# Patient Record
Sex: MALE | Race: BLACK OR AFRICAN AMERICAN | Employment: FULL TIME | URBAN - METROPOLITAN AREA
[De-identification: names, ages, dates, MRNs, and addresses within clinical notes are randomized per-mention and may not be internally consistent; named-entity substitution may affect disease eponyms.]

---

## 2022-11-23 ENCOUNTER — APPOINTMENT (OUTPATIENT)
Dept: GENERAL RADIOLOGY | Age: 57
End: 2022-11-23
Payer: COMMERCIAL

## 2022-11-23 ENCOUNTER — HOSPITAL ENCOUNTER (EMERGENCY)
Age: 57
Discharge: HOME | End: 2022-11-23
Attending: EMERGENCY MEDICINE
Payer: COMMERCIAL

## 2022-11-23 VITALS
WEIGHT: 170 LBS | TEMPERATURE: 98.1 F | HEART RATE: 91 BPM | BODY MASS INDEX: 23.8 KG/M2 | SYSTOLIC BLOOD PRESSURE: 129 MMHG | DIASTOLIC BLOOD PRESSURE: 60 MMHG | HEIGHT: 71 IN | OXYGEN SATURATION: 100 % | RESPIRATION RATE: 16 BRPM

## 2022-11-23 DIAGNOSIS — M25.551 ACUTE RIGHT HIP PAIN: Primary | ICD-10-CM

## 2022-11-23 DIAGNOSIS — S39.012A STRAIN OF LUMBAR REGION, INITIAL ENCOUNTER: ICD-10-CM
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
## 2022-11-23 DIAGNOSIS — S16.1XXA ACUTE STRAIN OF NECK MUSCLE, INITIAL ENCOUNTER: ICD-10-CM

## 2022-11-23 PROCEDURE — 6360000002 HC RX W HCPCS: Performed by: NURSE PRACTITIONER

## 2022-11-23 PROCEDURE — 99284 EMERGENCY DEPT VISIT MOD MDM: CPT

## 2022-11-23 PROCEDURE — 72100 X-RAY EXAM L-S SPINE 2/3 VWS: CPT

## 2022-11-23 PROCEDURE — 72040 X-RAY EXAM NECK SPINE 2-3 VW: CPT

## 2022-11-23 PROCEDURE — 73502 X-RAY EXAM HIP UNI 2-3 VIEWS: CPT

## 2022-11-23 PROCEDURE — 96372 THER/PROPH/DIAG INJ SC/IM: CPT

## 2022-11-23 RX ORDER — IBUPROFEN 800 MG/1
800 TABLET ORAL EVERY 8 HOURS PRN
Qty: 20 TABLET | Refills: 0 | Status: SHIPPED | OUTPATIENT
Start: 2022-11-23

## 2022-11-23 RX ORDER — METHOCARBAMOL 750 MG/1
750 TABLET, FILM COATED ORAL 3 TIMES DAILY PRN
Qty: 15 TABLET | Refills: 0 | Status: SHIPPED | OUTPATIENT
Start: 2022-11-23 | End: 2022-11-28

## 2022-11-23 RX ORDER — KETOROLAC TROMETHAMINE 30 MG/ML
30 INJECTION, SOLUTION INTRAMUSCULAR; INTRAVENOUS ONCE
Status: COMPLETED | OUTPATIENT
Start: 2022-11-23 | End: 2022-11-23

## 2022-11-23 RX ORDER — HYDROCODONE BITARTRATE AND ACETAMINOPHEN 5; 325 MG/1; MG/1
1 TABLET ORAL EVERY 6 HOURS PRN
Qty: 10 TABLET | Refills: 0 | Status: SHIPPED | OUTPATIENT
Start: 2022-11-23 | End: 2022-11-26

## 2022-11-23 RX ADMIN — KETOROLAC TROMETHAMINE 30 MG: 30 INJECTION, SOLUTION INTRAMUSCULAR at 11:22

## 2022-11-23 ASSESSMENT — PAIN DESCRIPTION - DESCRIPTORS: DESCRIPTORS: SHARP

## 2022-11-23 ASSESSMENT — ENCOUNTER SYMPTOMS
COLOR CHANGE: 1
BACK PAIN: 1

## 2022-11-23 ASSESSMENT — PAIN SCALES - GENERAL
PAINLEVEL_OUTOF10: 10
PAINLEVEL_OUTOF10: 10

## 2022-11-23 ASSESSMENT — PAIN - FUNCTIONAL ASSESSMENT: PAIN_FUNCTIONAL_ASSESSMENT: 0-10

## 2022-11-23 ASSESSMENT — PAIN DESCRIPTION - LOCATION: LOCATION: BACK;HIP;NECK

## 2022-11-23 ASSESSMENT — VISUAL ACUITY: OU: 1

## 2022-11-23 ASSESSMENT — PAIN DESCRIPTION - ORIENTATION: ORIENTATION: RIGHT

## 2022-11-23 ASSESSMENT — PAIN DESCRIPTION - FREQUENCY: FREQUENCY: CONTINUOUS

## 2022-11-23 NOTE — DISCHARGE INSTRUCTIONS
Take medication as prescribed. Use crutches for comfort. You may bear weight on her right leg. Do not drive, operate heavy machinery, or consume alcohol while taking Robaxin or Norco as they can cause drowsiness. Follow-up orthopedic doctor provided for for evaluation of your hip pain. Return to emergency department as needed. You may also follow-up with primary care physician on list provided as needed.

## 2022-11-23 NOTE — Clinical Note
Savannah Parker was seen and treated in our emergency department on 11/23/2022. He may return to work on 11/28/2022. If you have any questions or concerns, please don't hesitate to call.       Valentine Crocker, APRN - CNP
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
## 2022-12-06 ENCOUNTER — HOSPITAL ENCOUNTER (EMERGENCY)
Age: 57
Discharge: HOME | End: 2022-12-06
Attending: EMERGENCY MEDICINE
Payer: COMMERCIAL

## 2022-12-06 ENCOUNTER — APPOINTMENT (OUTPATIENT)
Dept: CT IMAGING | Age: 57
End: 2022-12-06
Payer: COMMERCIAL

## 2022-12-06 VITALS
DIASTOLIC BLOOD PRESSURE: 81 MMHG | BODY MASS INDEX: 23.01 KG/M2 | OXYGEN SATURATION: 97 % | WEIGHT: 165 LBS | HEART RATE: 79 BPM | TEMPERATURE: 97.9 F | RESPIRATION RATE: 14 BRPM | SYSTOLIC BLOOD PRESSURE: 140 MMHG

## 2022-12-06 DIAGNOSIS — M25.551 RIGHT HIP PAIN: Primary | ICD-10-CM

## 2022-12-06 PROCEDURE — 96372 THER/PROPH/DIAG INJ SC/IM: CPT

## 2022-12-06 PROCEDURE — 6360000002 HC RX W HCPCS: Performed by: EMERGENCY MEDICINE

## 2022-12-06 PROCEDURE — 73700 CT LOWER EXTREMITY W/O DYE: CPT

## 2022-12-06 PROCEDURE — 99284 EMERGENCY DEPT VISIT MOD MDM: CPT

## 2022-12-06 RX ORDER — HYDROCODONE BITARTRATE AND ACETAMINOPHEN 5; 325 MG/1; MG/1
1 TABLET ORAL EVERY 6 HOURS PRN
Qty: 20 TABLET | Refills: 0 | Status: SHIPPED | OUTPATIENT
Start: 2022-12-06 | End: 2022-12-11

## 2022-12-06 RX ORDER — KETOROLAC TROMETHAMINE 30 MG/ML
60 INJECTION, SOLUTION INTRAMUSCULAR; INTRAVENOUS ONCE
Status: COMPLETED | OUTPATIENT
Start: 2022-12-06 | End: 2022-12-06

## 2022-12-06 RX ADMIN — KETOROLAC TROMETHAMINE 60 MG: 30 INJECTION, SOLUTION INTRAMUSCULAR at 16:13

## 2022-12-06 ASSESSMENT — ENCOUNTER SYMPTOMS
DIARRHEA: 0
COUGH: 0
CONSTIPATION: 0
FACIAL SWELLING: 0
EYE DISCHARGE: 0
VOMITING: 0
ABDOMINAL PAIN: 0
COLOR CHANGE: 0
EYE REDNESS: 0
SHORTNESS OF BREATH: 0

## 2022-12-06 ASSESSMENT — PAIN SCALES - GENERAL: PAINLEVEL_OUTOF10: 10

## 2022-12-06 NOTE — LETTER
[facility] ED  [address] [place] [address]  Phone: [phone]             December 9, 2022    Patient: Faraz Mcguire   YOB: 1965   Date of Visit: 12/6/2022       To Whom It May Concern:    Faraz Mcguire was seen and treated in our emergency department on 12/6/2022. He may return to work on 12/10/2022.       Sincerely,             Signature:__________________________________
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
## 2022-12-21 ENCOUNTER — OFFICE VISIT (OUTPATIENT)
Dept: ORTHOPEDIC SURGERY | Age: 57
End: 2022-12-21
Payer: COMMERCIAL

## 2022-12-21 VITALS — WEIGHT: 169 LBS | BODY MASS INDEX: 23.66 KG/M2 | HEIGHT: 71 IN

## 2022-12-21 DIAGNOSIS — S83.207A TEAR OF MENISCUS OF LEFT KNEE, UNSPECIFIED MENISCUS, UNSPECIFIED TEAR TYPE, UNSPECIFIED WHETHER OLD OR CURRENT TEAR: ICD-10-CM

## 2022-12-21 DIAGNOSIS — R52 PAIN: Primary | ICD-10-CM

## 2022-12-21 PROCEDURE — G8427 DOCREV CUR MEDS BY ELIG CLIN: HCPCS

## 2022-12-21 PROCEDURE — G8484 FLU IMMUNIZE NO ADMIN: HCPCS

## 2022-12-21 PROCEDURE — 4004F PT TOBACCO SCREEN RCVD TLK: CPT

## 2022-12-21 PROCEDURE — 99203 OFFICE O/P NEW LOW 30 MIN: CPT

## 2022-12-21 PROCEDURE — G8420 CALC BMI NORM PARAMETERS: HCPCS

## 2022-12-21 PROCEDURE — 3017F COLORECTAL CA SCREEN DOC REV: CPT

## 2022-12-21 RX ORDER — METHYLPREDNISOLONE 4 MG/1
TABLET ORAL
Qty: 1 KIT | Refills: 0 | Status: SHIPPED | OUTPATIENT
Start: 2022-12-21 | End: 2022-12-27

## 2022-12-21 NOTE — LETTER
[facility]  [address] [facility]  Phone: [phone]  Fax: [phone]    Sofía Lloyd DO        December 21, 2022     Patient: Vito Arroyo   YOB: 1965   Date of Visit: 12/21/2022       To Whom It May Concern: It is my medical opinion that Vito Arroyo may return to work on 12/22/2022. Please excuseMagueSreekanth Nick from work on 12/21/2022, as he attended his scheduled appointment in my clinic. If you have any questions or concerns, please don't hesitate to call.     Sincerely,        Sofía Lloyd DO

## 2022-12-21 NOTE — PROGRESS NOTES
[address] [facility]  [facility] [address]  Dept: [address] [place] [address] Patient      CHIEF COMPLAINT:    Chief Complaint   Patient presents with    Pain     Right hip pain       HISTORY OF PRESENT ILLNESS:    The patient is a 65 y.o. male who is being seen as a new patient for right hip pain. Patient referred to the resident clinic after a visit to the ED on 12/6/22. Patient denies acute injuries and XR's of right hip demonstrated moderate arthritic changes. Patient denies any bony low back pain, though isolates it to the muscles of low back on right side. Patient denies numbness, tingling, or weakness. Patient has not had prior injections or taken oral steroids. Patient has not tried PT to date. Patient here for evaluation and updating treatment plan. Past Medical History:    No past medical history on file. Past Surgical History:    No past surgical history on file. Current Medications:   Current Outpatient Medications   Medication Sig Dispense Refill    methylPREDNISolone (MEDROL DOSEPACK) 4 MG tablet Take by mouth. 1 kit 0    ibuprofen (ADVIL;MOTRIN) 800 MG tablet Take 1 tablet by mouth every 8 hours as needed for Pain 20 tablet 0     No current facility-administered medications for this visit.        Allergies:    Motrin [ibuprofen]    Social History:   Social History     Socioeconomic History    Marital status:      Spouse name: Not on file    Number of children: Not on file    Years of education: Not on file    Highest education level: Not on file   Occupational History    Not on file   Tobacco Use    Smoking status: Some Days     Types: Cigars     Passive exposure: Past    Smokeless tobacco: Never   Vaping Use    Vaping Use: Never used   Substance and Sexual Activity    Alcohol use: Yes     Comment: socially    Drug use: Never    Sexual activity: Not on file   Other Topics Concern    Not on file   Social History Narrative    Not on file     Social Determinants of Health     Financial Resource Strain: Not on file   Food Insecurity: Not on file   Transportation Needs: Not on file   Physical Activity: Not on file   Stress: Not on file   Social Connections: Not on file   Intimate Partner Violence: Not on file   Housing Stability: Not on file       Family History:  No family history on file. REVIEW OF SYSTEMS:  Review of Systems    Gen: no fever, chills, malaise  CV: no chest pain or palpitations  Resp: no cough or shortness of breath  GI: no nausea, vomiting, diarrhea, or constipation  Neuro: no seizures, vertigo, or headaches  Msk: Right hip pain  10 remaining systems reviewed and negative      PHYSICAL EXAM:  Ht 5' 11\" (1.803 m)   Wt 169 lb (76.7 kg)   BMI 23.57 kg/m² Body mass index is 23.57 kg/m². Physical Exam  Gen: alert and oriented, no acute distress  Psych: Appropriate affect; Appropriate knowledge base; Appropriate mood; No hallucinations  Head: normocephalic atraumatic   Chest: symmetric chest excursion  Ortho Exam  MSK:   RLE: Minimal tenderness to palpation about the hip. Skin intact. + BRYANNA, + FADIR, + Stinchfield. Hip flexion 0-130 degrees. Minimal antalgic gait favoring right side. Compartments soft. 2+ DP pulse. TA/EHL/FHL/GS motor intact. Deep and Superficial Peroneal/Saphenous/Sural/Plantar SILT. Radiology:   No new films previous films reviewed       ASSESSMENT:  65 y.o. male with chronic right hip pain    PLAN:  Patient evaluated in clinic and educated patient on XR demonstrating moderate arthritic changes of right hip. Patient educated that this may eventually need a total hip replacement. At this time, patient elected for medrol dosepak sent to pharmacy and beginning PT. Patient encouraged to continue with activities as tolerated with utilizing ice and NSAIDs as needed. Patient instructed to follow-up in 2 months to reassess response to steroid and PT progress.  Patient understood and agreed with the plan with all questions being answered to the patient's satisfaction at the time of visit. Return in about 2 months (around 2/21/2023). Orders Placed This Encounter   Medications    methylPREDNISolone (MEDROL DOSEPACK) 4 MG tablet     Sig: Take by mouth. Dispense:  1 kit     Refill:  0       Orders Placed This Encounter   Procedures    XR HIP RIGHT (2-3 VIEWS)     Standing Status:   Future     Standing Expiration Date:   12/20/2023    MRI KNEE LEFT WO CONTRAST     Standing Status:   Future     Standing Expiration Date:   12/21/2023    [facility] - [address]     Referral Priority:   Routine     Referral Type:   Eval and Treat     Referral Reason:   Specialty Services Required     Requested Specialty:   Physical Therapist     Number of Visits Requested:   Grecia Jolly     Referral Priority:   Routine     Referral Type:   Eval and Treat     Referral Reason:   Specialty Services Required     Requested Specialty:   Physical Therapist     Number of Visits Requested:   1        Electronically signed by Maksim Chaves DO PGY-2 on 12/21/2022 at 11:30 AM    This note is created with the assistance of a speech recognition program.  While intending to generate a document that actually reflects the content of the visit, the document can still have some errors including those of syntax and sound a like substitutions which may escape proof reading.   In such instances, actual meaning can be extrapolated by contextual diversion

## 2023-01-09 ENCOUNTER — HOSPITAL ENCOUNTER (EMERGENCY)
Age: 58
Discharge: HOME | End: 2023-01-09
Attending: EMERGENCY MEDICINE
Payer: COMMERCIAL

## 2023-01-09 ENCOUNTER — APPOINTMENT (OUTPATIENT)
Dept: GENERAL RADIOLOGY | Age: 58
End: 2023-01-09
Payer: COMMERCIAL

## 2023-01-09 VITALS
TEMPERATURE: 97.6 F | OXYGEN SATURATION: 100 % | RESPIRATION RATE: 18 BRPM | DIASTOLIC BLOOD PRESSURE: 73 MMHG | BODY MASS INDEX: 23.8 KG/M2 | WEIGHT: 170 LBS | HEART RATE: 101 BPM | HEIGHT: 71 IN | SYSTOLIC BLOOD PRESSURE: 150 MMHG

## 2023-01-09 DIAGNOSIS — M25.551 RIGHT HIP PAIN: Primary | ICD-10-CM

## 2023-01-09 PROCEDURE — 99283 EMERGENCY DEPT VISIT LOW MDM: CPT

## 2023-01-09 PROCEDURE — 73502 X-RAY EXAM HIP UNI 2-3 VIEWS: CPT

## 2023-01-09 PROCEDURE — 6370000000 HC RX 637 (ALT 250 FOR IP): Performed by: PHYSICIAN ASSISTANT

## 2023-01-09 RX ORDER — HYDROCODONE BITARTRATE AND ACETAMINOPHEN 5; 325 MG/1; MG/1
1 TABLET ORAL EVERY 6 HOURS PRN
Status: COMPLETED | OUTPATIENT
Start: 2023-01-09 | End: 2023-01-09

## 2023-01-09 RX ORDER — HYDROCODONE BITARTRATE AND ACETAMINOPHEN 5; 325 MG/1; MG/1
1 TABLET ORAL EVERY 8 HOURS PRN
Qty: 6 TABLET | Refills: 0 | Status: SHIPPED | OUTPATIENT
Start: 2023-01-09 | End: 2023-01-12

## 2023-01-09 RX ADMIN — HYDROCODONE BITARTRATE AND ACETAMINOPHEN 1 TABLET: 5; 325 TABLET ORAL at 23:32

## 2023-01-09 ASSESSMENT — PAIN DESCRIPTION - DESCRIPTORS: DESCRIPTORS: SHARP

## 2023-01-09 ASSESSMENT — ENCOUNTER SYMPTOMS: BACK PAIN: 0

## 2023-01-09 ASSESSMENT — PAIN DESCRIPTION - DIRECTION: RADIATING_TOWARDS: TOES

## 2023-01-09 ASSESSMENT — PAIN SCALES - GENERAL: PAINLEVEL_OUTOF10: 10

## 2023-01-09 ASSESSMENT — PAIN DESCRIPTION - ORIENTATION: ORIENTATION: RIGHT

## 2023-01-09 ASSESSMENT — PAIN DESCRIPTION - LOCATION: LOCATION: HIP;LEG

## 2023-01-09 NOTE — LETTER
[facility] ED  [address] [place] [address]  Phone: [phone]               January 9, 2023    Patient: India Jonas   YOB: 1965   Date of Visit: 1/9/2023       To Whom It May Concern:    India Jonas was seen and treated in our emergency department on 1/9/2023. He may return to work on 1/11/2023.       Sincerely,       Chiquita Messer RN         Signature:__________________________________

## 2023-01-10 NOTE — ED TRIAGE NOTES
Pt ambulated to 10 with co right hip/leg pain. Pain x 2 months, 10/10, sharp quality, radiates from hip to toes. Pt has been evaluated for same co and has seen ortho. Pt to start PT but has not had appointment yet. Pt states he has not taken pain meds for 2 weeks. Pt respirations are even and unlabored, pt is alert and oriented X 4, speaking in complete sentences, bed is in the lowest position, call light is within reach. Will continue to monitor.

## 2023-11-27 NOTE — ED PROVIDER NOTES
EMERGENCY DEPARTMENT ENCOUNTER    Pt Name: Gareth Benton  MRN: [id number]  [place] 1965  Date of evaluation: 1/9/23  CHIEF COMPLAINT       Chief Complaint   Patient presents with    Hip Pain     Right side. Pt states he has arthritis and is going to PT. Leg Pain     Right. HISTORY OF PRESENT ILLNESS   HPI   Patient is a 60-year-old male who complains of right hip pain. He has been dealing with this for a while and has been seen in the emergency room and prescribed narcotic pain medication. Patient saw orthopedics on 12/21/2022 and was prescribed a Medrol Dosepak which she states he completed without relief of symptoms. Patient has not yet started physical therapy. He states that he was getting out of the car today when his right foot got caught in the floor mat of his car and pain in the right hip worsened and is now radiating into the right groin. He states that he is able to bear weight but has to stand on his tiptoes. He denies any numbness or tingling or weakness in the extremity. He has had no falls. He denies any loss of bladder or bowel control. REVIEW OF SYSTEMS     Review of Systems   Cardiovascular:  Negative for chest pain, palpitations and leg swelling. Musculoskeletal:  Positive for arthralgias (Right hip) and gait problem. Negative for back pain, joint swelling and myalgias. Skin: Negative. Neurological:  Negative for weakness and numbness. PASTMEDICAL HISTORY   No past medical history on file. Past Problem List  There is no problem list on file for this patient. SURGICAL HISTORY     No past surgical history on file. CURRENT MEDICATIONS       Previous Medications    IBUPROFEN (ADVIL;MOTRIN) 800 MG TABLET    Take 1 tablet by mouth every 8 hours as needed for Pain     ALLERGIES     is allergic to motrin [ibuprofen]. FAMILY HISTORY     has no family status information on file.       SOCIAL HISTORY       Social History     Tobacco Use    Smoking status: Some Days HPI    RTC 4 mo DFE, Epiretinal membrane (ERM) of left eye  Last edited by Nicolette Camacho MA on 11/27/2023 10:20 AM.            Assessment /Plan     For exam results, see Encounter Report.    OCT mac 11/21/22  OD: , intact foveal contour that appears excavated as previously noted, but no IRF/SRF  OS: , temporal ERM parafoveally with schisis of foveal contour and parafoveal IRF    OCT mac 7/2023  OD: , normal/slightly excavated foveal contour  OS: , foveoschisis with parafoveal IRF    OCT MAC 11/27/23  OD: , excavated foveal contour  OS: , foveoschisis with parafoveal IRF    Assessment /Plan     1. T2DM without ophthalmologic manifestations  - Last A1c as below:  Hemoglobin A1c   Date Value Ref Range Status   06/23/2023 7.5 (H) <=7.0 % Final   02/06/2023 8.2 (H) <=7.0 % Final   05/19/2022 8.0 (H) <=7.0 % Final   - No signs of retinopathy on exam  - No signs of DME on OCT mac  - Encouraged good BS/BP/Cholesterol control, f/u with PCP regularly  - Monitor with annual DFE (next due 11/2024)    2. ERM with foveoschisis OS  - seen with Dr. Martin 11/19/21: believes this is ERM with foveoschisis rather than a partial thickness hole. No treatment indicated for now as vision good and patient having no symptoms  - 5/12/23 with hyperopic shift likely exacerbation of macular pathology. RTC June-July to see Dr. Martin and discuss possible need for PPV/EMP  - 7/21/23: OCT stable. VA 20/25-2. Discussed RBA of surgery. Will defer for now as VA good.   - 11/27/23: OCT stable. BCVA 20/20 OU, mrx today. Will CTM.     3. Pseudophakia OU  4. PCO, non-visually significant, right eye  - NVS, given repeat MRX today but retinal etiology    RTC 4 mo DFE     Types: Cigars     Passive exposure: Past    Smokeless tobacco: Never   Vaping Use    Vaping Use: Never used   Substance Use Topics    Alcohol use: Yes     Comment: socially    Drug use: Never     PHYSICAL EXAM     INITIAL VITALS: BP (!) 150/73   Pulse (!) 101   Temp 97.6 °F (36.4 °C) (Oral)   Resp 18   Ht 5' 11\" (1.803 m)   Wt 170 lb (77.1 kg)   SpO2 100%   BMI 23.71 kg/m²    Physical Exam  Vitals and nursing note reviewed. Constitutional:       Appearance: Normal appearance. Cardiovascular:      Rate and Rhythm: Normal rate and regular rhythm. Pulses: Normal pulses. Heart sounds: Normal heart sounds. Pulmonary:      Effort: Pulmonary effort is normal.      Breath sounds: Normal breath sounds. Musculoskeletal:      Cervical back: Normal.      Thoracic back: Normal.      Lumbar back: Normal.      Right hip: Tenderness present. No deformity, lacerations, bony tenderness or crepitus. Decreased range of motion. Normal strength. Right upper leg: Normal.      Right knee: Normal.   Skin:     General: Skin is warm and dry. Neurological:      General: No focal deficit present. Mental Status: He is alert and oriented to person, place, and time. Sensory: Sensation is intact. No sensory deficit. Motor: Motor function is intact. No weakness. Psychiatric:         Mood and Affect: Mood normal.         Thought Content:  Thought content normal.       MEDICAL DECISION MAKING / ED COURSE:   Summary of Patient Presentation:        1)  Number and Complexity of Problems  Problem List This Visit:  right hip pain    Differential Diagnosis: Arthritis, muscle strain, fracture    Diagnoses Considered but Do Not Suspect:  none    Pertinent Comorbid Conditions: Arthritis    2)  Data Reviewed  My EKG interpretation:  n/a     Decision Rules/Scores utilized:  n/a    HEART SCORE:        NIH STROKE SCALE         Tests considered but not ordered and why:  none    External Documents Reviewed: none    Imaging that is independently reviewed and interpreted by me as:  n/a    See more data below for the lab and radiology tests and orders. 3)  Treatment and Disposition    Patient repeat assessment:  Patient comfortable    Disposition discussion with patient/family:  d/c home with family in stable condition    Case discussed with consulting clinician:  none    MIPS:  n/a    Social determinants of health impacting treatment or disposition:  none    Shared Decision Making:  none    Code Status Discussion:  n/a    \"ED Course\" Notes From Epic Narrator:  Patient is a 41-year-old male with known arthritis to the right hip. Finished course of steroids per orthopedics and is referred to PT but has not yet gone. No new findings on x-ray. Patient is given 1 [place] in the emergency room and discharged home with prescription. He is to follow-up with his orthopedic doctor tomorrow and return to the emergency room if symptoms worsen      CRITICAL CARE:       PROCEDURES:    Procedures      DATA FOR LAB AND RADIOLOGY TESTS ORDERED BELOW ARE REVIEWED BY THE ED CLINICIAN:    RADIOLOGY: All x-rays, CT, MRI, and formal ultrasound images (except ED bedside ultrasound) are read by the radiologist, see reports below, unless otherwise noted in MDM or here. Reports below are reviewed by myself. XR HIP 2-3 VW W PELVIS RIGHT   Final Result   No acute bony abnormalities are noted      Bilateral osteoarthritis of the hips, greater on the right             LABS: Lab orders shown below, the results are reviewed by myself, and all abnormals are listed below.   Labs Reviewed - No data to display    Vitals Reviewed:    Vitals:    01/09/23 2205   BP: (!) 150/73   Pulse: (!) 101   Resp: 18   Temp: 97.6 °F (36.4 °C)   TempSrc: Oral   SpO2: 100%   Weight: 170 lb (77.1 kg)   Height: 5' 11\" (1.803 m)     MEDICATIONS GIVEN TO PATIENT THIS ENCOUNTER:  Orders Placed This Encounter   Medications    HYDROcodone-acetaminophen (NORCO) 5-325 MG per tablet     Sig: Take 1 tablet by mouth every 8 hours as needed for Pain for up to 6 doses. Intended supply: 3 days. Take lowest dose possible to manage pain Max Daily Amount: 3 tablets     Dispense:  6 tablet     Refill:  0    HYDROcodone-acetaminophen (NORCO) 5-325 MG per tablet 1 tablet     DISCHARGE PRESCRIPTIONS:  New Prescriptions    HYDROCODONE-ACETAMINOPHEN (NORCO) 5-325 MG PER TABLET    Take 1 tablet by mouth every 8 hours as needed for Pain for up to 6 doses. Intended supply: 3 days. Take lowest dose possible to manage pain Max Daily Amount: 3 tablets     PHYSICIAN CONSULTS ORDERED THIS ENCOUNTER:  None  FINAL IMPRESSION      1. Right hip pain          DISPOSITION/PLAN   DISPOSITION Decision To Discharge 01/09/2023 11:03:02 PM      OUTPATIENT FOLLOW UP THE PATIENT:  No follow-up provider specified.     DIMA Bourne PA-C  01/09/23 [id number]